# Patient Record
Sex: MALE | Race: WHITE | NOT HISPANIC OR LATINO | Employment: OTHER | URBAN - METROPOLITAN AREA
[De-identification: names, ages, dates, MRNs, and addresses within clinical notes are randomized per-mention and may not be internally consistent; named-entity substitution may affect disease eponyms.]

---

## 2022-03-15 ENCOUNTER — TELEPHONE (OUTPATIENT)
Dept: FAMILY MEDICINE | Facility: CLINIC | Age: 37
End: 2022-03-15
Payer: MEDICARE

## 2022-03-15 NOTE — TELEPHONE ENCOUNTER
Received form from [facility], physical examination. Patient has not been seen since 2013. I called and informed Lilaminnie @ [phone], that patient will need an appointment to get the from filled out.Belinda Guy MA/LUCA

## 2022-08-11 ENCOUNTER — VIRTUAL VISIT (OUTPATIENT)
Dept: FAMILY MEDICINE | Facility: CLINIC | Age: 37
End: 2022-08-11
Payer: MEDICARE

## 2022-08-11 DIAGNOSIS — R50.9 FEVER, UNSPECIFIED FEVER CAUSE: ICD-10-CM

## 2022-08-11 DIAGNOSIS — R05.9 COUGH: ICD-10-CM

## 2022-08-11 DIAGNOSIS — U07.1 INFECTION DUE TO 2019 NOVEL CORONAVIRUS: Primary | ICD-10-CM

## 2022-08-11 PROCEDURE — 99203 OFFICE O/P NEW LOW 30 MIN: CPT | Mod: 95 | Performed by: INTERNAL MEDICINE

## 2022-08-11 RX ORDER — BENZONATATE 100 MG/1
100 CAPSULE ORAL 3 TIMES DAILY PRN
Qty: 30 CAPSULE | Refills: 0 | Status: SHIPPED | OUTPATIENT
Start: 2022-08-11

## 2022-08-11 NOTE — PROGRESS NOTES
Gurwinder is a 37 year old who is being evaluated via a billable video visit.      How would you like to obtain your AVS? Mail a copy  If the video visit is dropped, the invitation should be resent by: Text to cell phone: [phone]  Will anyone else be joining your video visit? No          Assessment & Plan     Infection due to 2019 novel coronavirus    Full discussion with patient regarding medication options/risks/benefits/common side effects/potential drug interactions.  Discussed Paxlovid has significant risk for drug interactions. We reviewed all prescription/OTC/supplements patient is taking and patient was asked to HOLD the following: none    Avoid alcohol while on paxlovid (and for three days after last dose) due to increased risk of liver inflammation/jaundice.  Patient confirms no known HIV diagnosis and understands Paxlovid may lead to complications if uncontrolled/undiagnosed HIV.    Call if any questions/concerns.   If worsening symptoms including but not limited to persistent shortness of breath or chest pain, patient instructed to go to emergency room.     - nirmatrelvir and ritonavir (PAXLOVID) therapy pack; Take 3 tablets by mouth 2 times daily for 5 days (Take 2 Nirmatrelvir tablets and 1 Ritonavir tablet twice daily for 5 days)  - benzonatate (TESSALON) 100 MG capsule; Take 1 capsule (100 mg) by mouth 3 times daily as needed for cough    Cough    - benzonatate (TESSALON) 100 MG capsule; Take 1 capsule (100 mg) by mouth 3 times daily as needed for cough    Fever, unspecified fever cause  Take tylenol or ibuprofen for fever.       See Patient Instructions    No follow-ups on file.    BHAUN HERRERA MD  [facility]    Hay Purdy is a 37 year old accompanied by his father, presenting for the following health issues:    Gurwinder has intellectual disability, he is non verbal.   No chief complaint on file.    HPI     COVID-19 Symptom Review  How many days ago did these symptoms  start? 1 day ago    Are any of the following symptoms significant for you?  New or worsening difficulty breathing? Yes    Please describe what kind of difficulty you are having breathing:Moderate dyspnea (short of breath with ADLs, shortness of breath that limits the ability to walk up one flight of stairs without needing to rest)    Worsening cough? Yes, I am coughing up mucus.    Fever or chills? Yes, the highest temperature was 100    Headache: N/A    Sore throat: N/A    Chest pain: N/A    Diarrhea: No    Body aches? N/A    What treatments has patient tried? None   Does patient live in a nursing home, group home, or shelter? No  Does patient have a way to get food/medications during quarantined? Yes, I have a friend or family member who can help me.      Review of Systems       Objective       Vitals:  No vitals were obtained today due to virtual visit.    Physical Exam   GEN: No acute distress  RESP: No audible increased work of breathing. Patient speaking in full sentences without distress.  PSYCH: pleasant  Exam otherwise limited due to virtual platform      Video-Visit Details    Video Start Time: 12:22 pm    Type of service:  Video Visit    Video End Time:12:30 pm    Originating Location (pt. Location): Home    Distant Location (provider location):  [facility]     Platform used for Video Visit: CIVICO    .  ..